# Patient Record
(demographics unavailable — no encounter records)

---

## 2024-10-07 NOTE — PHYSICAL EXAM
[Chaperone Present] : A chaperone was present in the examining room during all aspects of the physical examination [02770] : A chaperone was present during the pelvic exam. [Examination Of The Breasts] : a normal appearance [No Masses] : no breast masses were palpable [Labia Majora] : normal [Labia Minora] : normal [Normal] : normal [Uterine Adnexae] : normal

## 2024-10-07 NOTE — HISTORY OF PRESENT ILLNESS
[Abnormal Quantity] : abnormal quantity [Abnormal Duration] : abnormal duration [Heavy Bleeding] : heavy bleeding [FreeTextEntry1] : here for her annual s/p  section  of a healthy baby girl after failing 6 IUI'S AND 6 IVF'S....was successful! Baby doing well and patient doing well, except for very heavy menses

## 2024-10-07 NOTE — PROCEDURE
[Abnormal Uterine Bleeding] : abnormal uterine bleeding [Transvaginal Ultrasound] : transvaginal ultrasound [FreeTextEntry3] : no free fluid cervix normal [FreeTextEntry5] : 69 cc volume, 6 mm [FreeTextEntry7] : 3.1 cc [FreeTextEntry8] : 4.5 cc

## 2025-05-12 NOTE — PROCEDURE
[Pelvic Pain] : pelvic pain [Transvaginal Ultrasound] : transvaginal ultrasound [FreeTextEntry9] : ovulatory [FreeTextEntry3] : severe ovulatory pain no free fluid no cysts cervix normal [FreeTextEntry5] : 65 cc vol, 6 mm [FreeTextEntry7] : 2.1 cc [FreeTextEntry8] : 2.5 cc

## 2025-05-12 NOTE — HISTORY OF PRESENT ILLNESS
[FreeTextEntry1] : here due to severe pain with ovulation and menses ovulatory pain 7/10 dysmenorrhea 5.5./10 also with accompanying joint pain

## 2025-05-12 NOTE — PHYSICAL EXAM
[Chaperoned Physical Exam] : A chaperone was present in the examining room during all aspects of the physical examination. [MA] : MA [Labia Majora] : normal [Labia Minora] : normal [Normal] : normal [Uterine Adnexae] : normal [FreeTextEntry2] : britta